# Patient Record
Sex: MALE | Race: WHITE | NOT HISPANIC OR LATINO | Employment: FULL TIME | URBAN - NONMETROPOLITAN AREA
[De-identification: names, ages, dates, MRNs, and addresses within clinical notes are randomized per-mention and may not be internally consistent; named-entity substitution may affect disease eponyms.]

---

## 2017-08-16 ENCOUNTER — HOSPITAL ENCOUNTER (EMERGENCY)
Facility: HOSPITAL | Age: 37
Discharge: HOME | End: 2017-08-16
Attending: STUDENT IN AN ORGANIZED HEALTH CARE EDUCATION/TRAINING PROGRAM

## 2017-08-16 ENCOUNTER — APPOINTMENT (OUTPATIENT)
Dept: GENERAL RADIOLOGY | Facility: HOSPITAL | Age: 37
End: 2017-08-16

## 2017-08-16 VITALS
WEIGHT: 240 LBS | TEMPERATURE: 97.7 F | OXYGEN SATURATION: 96 % | RESPIRATION RATE: 18 BRPM | DIASTOLIC BLOOD PRESSURE: 95 MMHG | BODY MASS INDEX: 32.51 KG/M2 | SYSTOLIC BLOOD PRESSURE: 129 MMHG | HEART RATE: 78 BPM | HEIGHT: 72 IN

## 2017-08-16 DIAGNOSIS — R07.9 CHEST PAIN, UNSPECIFIED TYPE: Primary | ICD-10-CM

## 2017-08-16 LAB
ALBUMIN SERPL-MCNC: 4.8 G/DL (ref 3.5–5)
ALBUMIN/GLOB SERPL: 1.5 G/DL (ref 1–2)
ALP SERPL-CCNC: 105 U/L (ref 38–126)
ALT SERPL W P-5'-P-CCNC: 76 U/L (ref 13–69)
ANION GAP SERPL CALCULATED.3IONS-SCNC: 16.8 MMOL/L
AST SERPL-CCNC: 83 U/L (ref 15–46)
BASOPHILS # BLD AUTO: 0.09 10*3/MM3 (ref 0–0.2)
BASOPHILS NFR BLD AUTO: 0.9 % (ref 0–2.5)
BILIRUB SERPL-MCNC: 1 MG/DL (ref 0.2–1.3)
BUN BLD-MCNC: 17 MG/DL (ref 7–20)
BUN/CREAT SERPL: 12.1 (ref 6.3–21.9)
CALCIUM SPEC-SCNC: 9.5 MG/DL (ref 8.4–10.2)
CHLORIDE SERPL-SCNC: 101 MMOL/L (ref 98–107)
CO2 SERPL-SCNC: 27 MMOL/L (ref 26–30)
CREAT BLD-MCNC: 1.4 MG/DL (ref 0.6–1.3)
DEPRECATED RDW RBC AUTO: 43.3 FL (ref 37–54)
EOSINOPHIL # BLD AUTO: 0.15 10*3/MM3 (ref 0–0.7)
EOSINOPHIL NFR BLD AUTO: 1.6 % (ref 0–7)
ERYTHROCYTE [DISTWIDTH] IN BLOOD BY AUTOMATED COUNT: 12.7 % (ref 11.5–14.5)
GFR SERPL CREATININE-BSD FRML MDRD: 56 ML/MIN/1.73
GLOBULIN UR ELPH-MCNC: 3.2 GM/DL
GLUCOSE BLD-MCNC: 96 MG/DL (ref 74–98)
HCT VFR BLD AUTO: 41.9 % (ref 42–52)
HGB BLD-MCNC: 14.3 G/DL (ref 14–18)
HOLD SPECIMEN: NORMAL
IMM GRANULOCYTES # BLD: 0.03 10*3/MM3 (ref 0–0.06)
IMM GRANULOCYTES NFR BLD: 0.3 % (ref 0–0.6)
LYMPHOCYTES # BLD AUTO: 3.34 10*3/MM3 (ref 0.6–3.4)
LYMPHOCYTES NFR BLD AUTO: 35.2 % (ref 10–50)
MCH RBC QN AUTO: 31.8 PG (ref 27–31)
MCHC RBC AUTO-ENTMCNC: 34.1 G/DL (ref 30–37)
MCV RBC AUTO: 93.1 FL (ref 80–94)
MONOCYTES # BLD AUTO: 0.72 10*3/MM3 (ref 0–0.9)
MONOCYTES NFR BLD AUTO: 7.6 % (ref 0–12)
NEUTROPHILS # BLD AUTO: 5.15 10*3/MM3 (ref 2–6.9)
NEUTROPHILS NFR BLD AUTO: 54.4 % (ref 37–80)
NRBC BLD MANUAL-RTO: 0 /100 WBC (ref 0–0)
PLATELET # BLD AUTO: 320 10*3/MM3 (ref 130–400)
PMV BLD AUTO: 9.1 FL (ref 6–12)
POTASSIUM BLD-SCNC: 3.8 MMOL/L (ref 3.5–5.1)
PROT SERPL-MCNC: 8 G/DL (ref 6.3–8.2)
RBC # BLD AUTO: 4.5 10*6/MM3 (ref 4.7–6.1)
SODIUM BLD-SCNC: 141 MMOL/L (ref 137–145)
TROPONIN I SERPL-MCNC: 0.01 NG/ML (ref 0–0.05)
TROPONIN I SERPL-MCNC: <0.012 NG/ML (ref 0–0.03)
TROPONIN I SERPL-MCNC: <0.012 NG/ML (ref 0–0.03)
WBC NRBC COR # BLD: 9.48 10*3/MM3 (ref 4.8–10.8)
WHOLE BLOOD HOLD SPECIMEN: NORMAL
WHOLE BLOOD HOLD SPECIMEN: NORMAL

## 2017-08-16 PROCEDURE — 80053 COMPREHEN METABOLIC PANEL: CPT

## 2017-08-16 PROCEDURE — 71010 HC CHEST PA OR AP: CPT

## 2017-08-16 PROCEDURE — 84484 ASSAY OF TROPONIN QUANT: CPT | Performed by: STUDENT IN AN ORGANIZED HEALTH CARE EDUCATION/TRAINING PROGRAM

## 2017-08-16 PROCEDURE — 93005 ELECTROCARDIOGRAM TRACING: CPT

## 2017-08-16 PROCEDURE — 85025 COMPLETE CBC W/AUTO DIFF WBC: CPT

## 2017-08-16 PROCEDURE — 99284 EMERGENCY DEPT VISIT MOD MDM: CPT

## 2017-08-16 PROCEDURE — 84484 ASSAY OF TROPONIN QUANT: CPT

## 2017-08-16 RX ORDER — LISINOPRIL 20 MG/1
20 TABLET ORAL DAILY
COMMUNITY

## 2017-08-16 RX ORDER — ASPIRIN 325 MG
325 TABLET ORAL ONCE
Status: COMPLETED | OUTPATIENT
Start: 2017-08-16 | End: 2017-08-16

## 2017-08-16 RX ORDER — MELOXICAM 15 MG/1
15 TABLET ORAL DAILY PRN
COMMUNITY

## 2017-08-16 RX ORDER — SODIUM CHLORIDE 0.9 % (FLUSH) 0.9 %
10 SYRINGE (ML) INJECTION AS NEEDED
Status: DISCONTINUED | OUTPATIENT
Start: 2017-08-16 | End: 2017-08-17 | Stop reason: HOSPADM

## 2017-08-16 RX ADMIN — ASPIRIN 325 MG ORAL TABLET 325 MG: 325 PILL ORAL at 19:38

## 2021-02-05 ENCOUNTER — HOSPITAL ENCOUNTER (EMERGENCY)
Facility: HOSPITAL | Age: 41
Discharge: HOME | End: 2021-02-06
Attending: FAMILY MEDICINE
Payer: OTHER GOVERNMENT

## 2021-02-05 VITALS
RESPIRATION RATE: 18 BRPM | TEMPERATURE: 97.7 F | BODY MASS INDEX: 31.83 KG/M2 | SYSTOLIC BLOOD PRESSURE: 136 MMHG | HEART RATE: 90 BPM | DIASTOLIC BLOOD PRESSURE: 89 MMHG | HEIGHT: 72 IN | OXYGEN SATURATION: 98 % | WEIGHT: 235 LBS

## 2021-02-05 DIAGNOSIS — S61.213A LACERATION OF LEFT MIDDLE FINGER WITHOUT FOREIGN BODY WITHOUT DAMAGE TO NAIL, INITIAL ENCOUNTER: Primary | ICD-10-CM

## 2021-02-05 DIAGNOSIS — S61.012A LACERATION OF LEFT THUMB WITHOUT FOREIGN BODY WITHOUT DAMAGE TO NAIL, INITIAL ENCOUNTER: ICD-10-CM

## 2021-02-05 PROCEDURE — 99282 EMERGENCY DEPT VISIT SF MDM: CPT

## 2021-02-05 SDOH — HEALTH STABILITY: MENTAL HEALTH: HOW OFTEN DO YOU HAVE A DRINK CONTAINING ALCOHOL?: NEVER

## 2021-02-06 NOTE — ED NOTES
Pt left ED ambulatory with belongings at this time. Pt verbalized understanding of discharge instructions.       Keyona Rojas RN  02/06/21 8921

## 2021-02-06 NOTE — ED PROVIDER NOTES
[address] [address]  eMERGENCY dEPARTMENT eNCOUnter      Pt Name: Ginna Dolan  MRN: [id number]  [place] 1980  Date of evaluation: 2/5/2021  Provider: Sonali Latif MD    [address] [facility]       Chief Complaint   Patient presents with    Laceration         HISTORY OF PRESENT ILLNESS   (Location/Symptom, Timing/Onset, Context/Setting, Quality, Duration, Modifying Factors, Severity)  Note limiting factors. Ginna Dolan is a 41 y.o. male who presents to the emergency department because he cut his left thumb and left middle finger tonight while he was cutting some wire in his job as an . Last tetanus shot was 2 years ago. Nursing Notes were reviewed. REVIEW OF SYSTEMS    (2-9 systems for level 4, 10 or more forlevel 5)     Review of Systems   Skin: Positive for wound. Except as noted above the remainder of the review of systems was reviewed and negative. PAST MEDICAL HISTORY   History reviewed. No pertinent past medical history. SURGICAL HISTORY     History reviewed. No pertinent surgical history. CURRENT MEDICATIONS       Previous Medications    No medications on file       ALLERGIES     Morphine    FAMILY HISTORY     History reviewed. No pertinent family history.        SOCIAL HISTORY       Social History     Socioeconomic History    Marital status:      Spouse name: None    Number of children: None    Years of education: None    Highest education level: None   Occupational History    None   Social Needs    Financial resource strain: None    Food insecurity     Worry: None     Inability: None    Transportation needs     Medical: None     Non-medical: None   Tobacco Use    Smoking status: Never Smoker    Smokeless tobacco: Never Used   Substance and Sexual Activity    Alcohol use: Never     Frequency: Never    Drug use: None    Sexual activity: None   Lifestyle    Physical activity     Days per week: None     Minutes per session: None    Stress: None   Relationships    Social connections     Talks on phone: None     Gets together: None     Attends Mandaen service: None     Active member of club or organization: None     Attends meetings of clubs or organizations: None     Relationship status: None    Intimate partner violence     Fear of current or ex partner: None     Emotionally abused: None     Physically abused: None     Forced sexual activity: None   Other Topics Concern    None   Social History Narrative    None       SCREENINGS             PHYSICAL EXAM    (up to 7 for level 4, 8 or more for level 5)     ED Triage Vitals [02/05/21 2343]   BP Temp Temp Source Pulse Resp SpO2 Height Weight   136/89 97.7 °F (36.5 °C) Temporal 90 18 98 % 6' (1.829 m) 235 lb (106.6 kg)       Physical Exam  Vitals signs and nursing note reviewed. Skin:     Comments: There is a 1 cm flap type laceration to the tip of the left thumb which does not involve the nailbed. There is no tissue missing. There is no tendon involvement. There is a 1.5 cm vertical laceration to the flexor surface of the distal phalanx of the left middle finger. There is no tendon involvement. There is no nailbed involvement. DIAGNOSTIC RESULTS     EKG: All EKG's are interpreted by the Emergency Department Physician who either signs or Co-signs this chart in the absence of a cardiologist.        RADIOLOGY:   Non-plain film images such as CT, Ultrasound and MRI are read by the radiologist. Plainradiographic images are visualized and preliminarily interpreted by the emergency physician with the below findings:        Interpretation per the Radiologist below, if available at the time of this note:    No orders to display         ED BEDSIDE ULTRASOUND:   Performed by ED Physician - none    LABS:  Labs Reviewed - No data to display    All other labs were within normal range or not returned as of this dictation.     EMERGENCY DEPARTMENT COURSE and DIFFERENTIALDIAGNOSIS/MDM:   Vitals:    Vitals:    02/05/21 2343   BP: 136/89   Pulse: 90   Resp: 18   Temp: 97.7 °F (36.5 °C)   TempSrc: Temporal   SpO2: 98%   Weight: 235 lb (106.6 kg)   Height: 6' (1.829 m)           CRITICALCARE TIME   Total Critical Care time was 0 minutes, excludingseparately reportable procedures. There was a high probabilityof clinically significant/life threatening deterioration in the patient's condition which required my urgent intervention. CONSULTS:  None    PROCEDURES:  Wound closed with steri strips. None    FINAL IMPRESSION      1. Laceration of left middle finger without foreign body without damage to nail, initial encounter    2. Laceration of left thumb without foreign body without damage to nail, initial encounter        DISPOSITION/PLAN   DISPOSITION Decision To Discharge 02/05/2021 11:54:31 PM      PATIENT REFERRED TO:  [facility]  [facility] [address]   [facility]  [phone]    As needed      DISCHARGE MEDICATIONS:  New Prescriptions    No medications on file       (Please note that portions ofthis note were completed with a voice recognition program.  Efforts were made to edit the dictations but occasionally words are mis-transcribed.)    Brendon Chacon MD(electronically signed)  Attending Emergency Physician          Brendon Chacon MD  02/05/21 [id number] Radha Oliver MD  03/28/21 9661

## 2025-02-14 NOTE — ED PROVIDER NOTES
Subjective   HPI Comments: Patient is a 37-year-old male began having midsternal chest pain approximately 30 minutes prior to arrival.  Patient states the pain only lasts a couple seconds more evident multiple times a minute.  Pain is described as an ache that radiates to his right shoulder and usually comes in waves.  It started after eating.  He has no known history of coronary artery disease.  Does have a history of hypertension.  Currently has improved some but it is still present.  No nausea, vomiting, or shortness of breath.      Review of Systems   All other systems reviewed and are negative.      Past Medical History:   Diagnosis Date   • Chronic back pain    • Diverticulitis    • Hypertension    • PTSD (post-traumatic stress disorder)        Allergies   Allergen Reactions   • Morphine And Related Anaphylaxis       Past Surgical History:   Procedure Laterality Date   • ORTHOPEDIC SURGERY         History reviewed. No pertinent family history.    Social History     Social History   • Marital status:      Spouse name: N/A   • Number of children: N/A   • Years of education: N/A     Social History Main Topics   • Smoking status: Never Smoker   • Smokeless tobacco: None   • Alcohol use Yes      Comment: rarely   • Drug use: No   • Sexual activity: Not Asked     Other Topics Concern   • None     Social History Narrative   • None           Objective   Physical Exam   Nursing note and vitals reviewed.    GEN: No acute distress  Head: Normocephalic, atraumatic  Eyes: Pupils equal round reactive to light  ENT: Posterior pharynx normal in appearance, oral mucosa is moist  Chest: Nontender to palpation  Cardiovascular: Regular rate  Lungs: Clear to auscultation bilaterally  Abdomen: Soft, nontender, nondistended, no peritoneal signs  Extremities: No edema, normal appearance  Neuro: GCS 15  Psych: Mood and affect are appropriate    Procedures         ED Course  ED Course   Comment By Time   Normal sinus rhythm rate  89.  No significant ST segments.  Intervals are normal.  Normal EKG. Dayton Altamirano MD 08/16 9899                  MDM  Number of Diagnoses or Management Options  Chest pain, unspecified type:   Diagnosis management comments: Differential diagnosis for this patient would include acute coronary syndrome, pulmonary embolus, thoracic aortic dissection, pericarditis, pneumonia, pneumothorax, Boerhaave syndrome, or other concerns.  Patient's chest x-ray shows no evidence of infiltrate or pneumothorax with a normal mediastinum.  I doubt thoracic aortic dissection as the pain was not maximal in onset, ripping or tearing, did not migrate, along with a normal mediastinum on chest x-ray.  At this time the pain would be atypical for pulmonary embolus as it does not have a pleuritic component and is intermittent.  Serial troponins will be drawn to assess for acute MI.       Amount and/or Complexity of Data Reviewed  Clinical lab tests: ordered and reviewed  Discussion of test results with the performing providers: yes  Decide to obtain previous medical records or to obtain history from someone other than the patient: yes  Obtain history from someone other than the patient: yes  Review and summarize past medical records: yes  Discuss the patient with other providers: yes  Independent visualization of images, tracings, or specimens: yes        Final diagnoses:   Chest pain, unspecified type            Dayton Altamirano MD  08/16/17 7991     This was a shared visit with the [facility]. I reviewed and verified the documentation.